# Patient Record
Sex: MALE | Race: WHITE | NOT HISPANIC OR LATINO
[De-identification: names, ages, dates, MRNs, and addresses within clinical notes are randomized per-mention and may not be internally consistent; named-entity substitution may affect disease eponyms.]

---

## 2021-11-18 ENCOUNTER — APPOINTMENT (OUTPATIENT)
Dept: PULMONOLOGY | Facility: CLINIC | Age: 71
End: 2021-11-18
Payer: MEDICARE

## 2021-11-18 VITALS
BODY MASS INDEX: 39.8 KG/M2 | HEART RATE: 68 BPM | HEIGHT: 70 IN | OXYGEN SATURATION: 95 % | TEMPERATURE: 97.6 F | SYSTOLIC BLOOD PRESSURE: 152 MMHG | WEIGHT: 278 LBS | RESPIRATION RATE: 16 BRPM | DIASTOLIC BLOOD PRESSURE: 82 MMHG

## 2021-11-18 DIAGNOSIS — G47.33 OBSTRUCTIVE SLEEP APNEA (ADULT) (PEDIATRIC): ICD-10-CM

## 2021-11-18 PROCEDURE — 99203 OFFICE O/P NEW LOW 30 MIN: CPT

## 2021-11-18 NOTE — PHYSICAL EXAM
[General Appearance - Well Developed] : well developed [Normal Appearance] : normal appearance [Well Groomed] : well groomed [General Appearance - Well Nourished] : well nourished [No Deformities] : no deformities [General Appearance - In No Acute Distress] : no acute distress [Normal Conjunctiva] : the conjunctiva exhibited no abnormalities [Eyelids - No Xanthelasma] : the eyelids demonstrated no xanthelasmas [Low Lying Soft Palate] : low lying soft palate [] : no respiratory distress [Auscultation Breath Sounds / Voice Sounds] : lungs were clear to auscultation bilaterally [Deep Tendon Reflexes (DTR)] : deep tendon reflexes were 2+ and symmetric [Sensation] : the sensory exam was normal to light touch and pinprick [No Focal Deficits] : no focal deficits [Oriented To Time, Place, And Person] : oriented to person, place, and time [Impaired Insight] : insight and judgment were intact [Affect] : the affect was normal

## 2021-11-18 NOTE — HISTORY OF PRESENT ILLNESS
[FreeTextEntry1] : 71-year-old male with a long history of obstructive sleep apnea.  He was initially diagnosed in 2006.  At that time a severe degree of obstructive sleep apnea was noted with an apnea-hypopnea index of 33 disordered breathing events per hour with 6.2% of recording time less than 9% oxygen saturated.  He has been using positive airway pressure therapy since then with improvement in sleep apnea related symptomatology.  At the present time he has been using CPAP nightly both in APAP as well as CPAP modes on his current ResMed device.  He reports a history of hypertension, hyperlipidemia and obesity.  He has no other medical problems or change in his medical status recently.  He presents today to obtain authorization for a replacement CPAP device.  He notes that he goes to bed at approximately 10:30 PM and arises at 7 AM and denies difficulty initiating or maintaining sleep.  He reports occasional leg jerks at night.  However he denies regular uncomfortable sensations in his lower extremities in the evening or prior to bedtime.  Although he occasionally does experience lower extremity discomfort is not problematic.  ESS equals 7.  No other new interim medical problems.

## 2021-11-18 NOTE — ASSESSMENT
[FreeTextEntry1] : 71-year-old male with a history of severe sleep apnea who is been treated with positive airway pressure therapy for many years.  Uses both CPAP at 10 cm of water pressure as well as auto titrating positive airway pressure therapy 10-20 cm of water pressure.  Both modalities effectively control his obstructive sleep apnea with therapy based AHI's within normal limits.  He uses the device nightly for more than 9 hours per night.  He typically sleeps from 10:30 PM to 7 AM and notes that he is refreshed upon awakening in the morning.  He continues to benefit from CPAP therapy and will continue its use nightly as prescribed.  New equipment will be ordered for him as his current device is more than 5 years old.  He will follow-up once he receives new equipment.  He has occasional leg jerks at night but does not have clinically bothersome RLS symptoms.  He was advised to continue to pursue weight management and was informed about the [facility] weight management program but he states that he wishes to do this on his own.  He will follow-up annually or earlier if needed.

## 2025-01-08 ENCOUNTER — APPOINTMENT (OUTPATIENT)
Dept: ORTHOPEDIC SURGERY | Facility: CLINIC | Age: 75
End: 2025-01-08
Payer: MEDICARE

## 2025-01-08 ENCOUNTER — APPOINTMENT (OUTPATIENT)
Dept: ORTHOPEDIC SURGERY | Facility: CLINIC | Age: 75
End: 2025-01-08

## 2025-01-08 VITALS — HEIGHT: 70 IN | WEIGHT: 214 LBS | BODY MASS INDEX: 30.64 KG/M2

## 2025-01-08 DIAGNOSIS — M17.11 UNILATERAL PRIMARY OSTEOARTHRITIS, RIGHT KNEE: ICD-10-CM

## 2025-01-08 DIAGNOSIS — M70.41 PREPATELLAR BURSITIS, RIGHT KNEE: ICD-10-CM

## 2025-01-08 PROCEDURE — 73564 X-RAY EXAM KNEE 4 OR MORE: CPT | Mod: RT

## 2025-01-08 PROCEDURE — 99204 OFFICE O/P NEW MOD 45 MIN: CPT

## 2025-01-08 RX ORDER — SULFAMETHOXAZOLE AND TRIMETHOPRIM 800; 160 MG/1; MG/1
800-160 TABLET ORAL TWICE DAILY
Qty: 28 | Refills: 0 | Status: ACTIVE | COMMUNITY
Start: 2025-01-08 | End: 1900-01-01

## 2025-01-08 RX ORDER — APIXABAN 5 MG/1
5 TABLET, FILM COATED ORAL
Refills: 0 | Status: ACTIVE | COMMUNITY